# Patient Record
(demographics unavailable — no encounter records)

---

## 2025-02-03 NOTE — PHYSICAL EXAM
[Alert] : alert [Well Nourished] : well nourished [Obese] : obese [No Acute Distress] : no acute distress [Well Developed] : well developed [Normal Sclera/Conjunctiva] : normal sclera/conjunctiva [EOMI] : extra ocular movement intact [No Proptosis] : no proptosis [Normal Oropharynx] : the oropharynx was normal [Thyroid Not Enlarged] : the thyroid was not enlarged [No Thyroid Nodules] : no palpable thyroid nodules [No Respiratory Distress] : no respiratory distress [No Accessory Muscle Use] : no accessory muscle use [Clear to Auscultation] : lungs were clear to auscultation bilaterally [Normal S1, S2] : normal S1 and S2 [Normal Rate] : heart rate was normal [Regular Rhythm] : with a regular rhythm [No Edema] : no peripheral edema [Pedal Pulses Normal] : the pedal pulses are present [Normal Bowel Sounds] : normal bowel sounds [Not Tender] : non-tender [Not Distended] : not distended [Soft] : abdomen soft [Normal Anterior Cervical Nodes] : no anterior cervical lymphadenopathy [Normal Posterior Cervical Nodes] : no posterior cervical lymphadenopathy [No Spinal Tenderness] : no spinal tenderness [Spine Straight] : spine straight [No Stigmata of Cushings Syndrome] : no stigmata of Cushings Syndrome [Normal Gait] : normal gait [Normal Strength/Tone] : muscle strength and tone were normal [No Rash] : no rash [Acanthosis Nigricans] : no acanthosis nigricans [No Motor Deficits] : the motor exam was normal [No Sensory Deficits] : the sensory exam was normal to light touch and pinprick [Normal Reflexes] : deep tendon reflexes were 2+ and symmetric [No Tremors] : no tremors [Oriented x3] : oriented to person, place, and time [de-identified] :  patient BMI is at 28.98

## 2025-02-03 NOTE — HISTORY OF PRESENT ILLNESS
[FreeTextEntry1] :  81-year-old white male who has a past medical history of for type 2 diabetes, hyperlipidemia, presents for routine follow-up.  Patient is currently taking insulin Toujeo 20 units at night, metformin 500 mg tablets 2 tablets twice a day, and glimepiride 4 mg tablets daily.  Patient currently is not taking the Humalog insulin and also the patient does not check his sugars at home.  He does report a slight weight loss of 5 pounds as he is eating healthier his vision has remained stable.  His other medications include lisinopril 10 mg daily omeprazole daily rosuvastatin daily and dicyclomine.  Patient denies any numbness or pain of his lower extremities.  Physically the patient is not very active.  He does have mild area and he is drinking significant amounts of liquids.  Review of systems otherwise negative

## 2025-02-03 NOTE — ASSESSMENT
[Diabetes Foot Care] : diabetes foot care [Long Term Vascular Complications] : long term vascular complications of diabetes [Carbohydrate Consistent Diet] : carbohydrate consistent diet [Importance of Diet and Exercise] : importance of diet and exercise to improve glycemic control, achieve weight loss and improve cardiovascular health [Exercise/Effect on Glucose] : exercise/effect on glucose [Hypoglycemia Management] : hypoglycemia management [Retinopathy Screening] : Patient was referred to ophthalmology for retinopathy screening [FreeTextEntry1] :  elderly white male who has a past medical type 2 diabetes and who is currently on insulin together with metformin.  Patient however is not taking hemologic insulin and he is hemoglobin A1c is 8 .6  his complete metabolic panel is normal.  Patient is noncompliant with his medication and is not taking the Humalog as instructed before and also is not monitoring his blood glucose levels at home recommendation 1.  I have advised the patient to start insulin Humalog 10 units before breakfast and 15 units before dinner 2.  We will arrange a  continuous glucose monitor for the patient so that he can see the values of his blood glucose levels and will adjust the insulin therapy depending upon the readings. 3.  The importance of a strict low-carb and low-fat diet was explained to the patient and also the necessity for regular physical exercise 4.  Patient is scheduled to undergo a complete eye examination within the next few weeks 5.  If the patient remains clinically stable follow-up in approximately 3 months.  The plan was in detail with the patient thank you

## 2025-02-03 NOTE — REVIEW OF SYSTEMS
[Fatigue] : no fatigue [Decreased Appetite] : appetite not decreased [Recent Weight Loss (___ Lbs)] : recent weight loss: [unfilled] lbs [Negative] : Heme/Lymph

## 2025-06-24 NOTE — HISTORY OF PRESENT ILLNESS
[FreeTextEntry1] :  1-year-old white male with past medical history of type 2 diabetes, hyperlipidemia, irritable bowel syndrome who presents for routine follow-up.  Patient is currently on glimepiride 4 mg tablets 1 daily, metformin 500 mg tablets 4 tablets daily at night, insulin Solostar Toujeo 20 units at nighttime.  Patient also informs me that he is taking Trulicity but he does not recall the dose and the doctor who had  prescribed  it. patient did not bring his glucose log book.  He claims that the sugar levels are always high closer to 150 mg per DL especially after the meals.  He does have mild polyuria and polydipsia and chronic fatigue.  His weight has remained stable and he denies any change in his vision or any numbness of extremities.  Physically the patient is not active and does not perform any exercise or walking review of the systems is negative for headache, chest pain, shortness of breath, nausea vomiting or any skin rash

## 2025-06-24 NOTE — PHYSICAL EXAM
[Alert] : alert [Well Nourished] : well nourished [No Acute Distress] : no acute distress [Well Developed] : well developed [Normal Sclera/Conjunctiva] : normal sclera/conjunctiva [EOMI] : extra ocular movement intact [No Proptosis] : no proptosis [Normal Oropharynx] : the oropharynx was normal [Thyroid Not Enlarged] : the thyroid was not enlarged [No Thyroid Nodules] : no palpable thyroid nodules [No Respiratory Distress] : no respiratory distress [No Accessory Muscle Use] : no accessory muscle use [Clear to Auscultation] : lungs were clear to auscultation bilaterally [Normal S1, S2] : normal S1 and S2 [Normal Rate] : heart rate was normal [Regular Rhythm] : with a regular rhythm [Carotids Normal] : carotid pulses were normal with no bruits [No Edema] : no peripheral edema [Pedal Pulses Normal] : the pedal pulses are present [Normal Bowel Sounds] : normal bowel sounds [Not Tender] : non-tender [Not Distended] : not distended [Soft] : abdomen soft [No HSM] : no hepato-splenomegaly [Normal Supraclavicular Nodes] : no supraclavicular lymphadenopathy [Normal Anterior Cervical Nodes] : no anterior cervical lymphadenopathy [Normal Posterior Cervical Nodes] : no posterior cervical lymphadenopathy [No CVA Tenderness] : no ~M costovertebral angle tenderness [No Spinal Tenderness] : no spinal tenderness [Spine Straight] : spine straight [No Stigmata of Cushings Syndrome] : no stigmata of Cushings Syndrome [Normal Gait] : normal gait [No Clubbing, Cyanosis] : no clubbing  or cyanosis of the fingernails [No Joint Swelling] : no joint swelling seen [Normal Strength/Tone] : muscle strength and tone were normal [No Rash] : no rash [No Skin Lesions] : no skin lesions [Acanthosis Nigricans] : no acanthosis nigricans [Foot Ulcers] : no foot ulcers [No Motor Deficits] : the motor exam was normal [No Sensory Deficits] : the sensory exam was normal to light touch and pinprick [Normal Reflexes] : deep tendon reflexes were 2+ and symmetric [No Tremors] : no tremors [Oriented x3] : oriented to person, place, and time [de-identified] :  patient BMI is at 28.19 weight is 180 pounds

## 2025-06-24 NOTE — REVIEW OF SYSTEMS
[Fatigue] : fatigue [Decreased Appetite] : appetite not decreased [Fever] : no fever [Chills] : no chills [Negative] : Heme/Lymph

## 2025-06-24 NOTE — ASSESSMENT
[Diabetes Foot Care] : diabetes foot care [Long Term Vascular Complications] : long term vascular complications of diabetes [Carbohydrate Consistent Diet] : carbohydrate consistent diet [Importance of Diet and Exercise] : importance of diet and exercise to improve glycemic control, achieve weight loss and improve cardiovascular health [Exercise/Effect on Glucose] : exercise/effect on glucose [Hypoglycemia Management] : hypoglycemia management [Self Monitoring of Blood Glucose] : self monitoring of blood glucose [Retinopathy Screening] : Patient was referred to ophthalmology for retinopathy screening [FreeTextEntry1] :  daily white male with a past medical history of type 2 diabetes, hyperlipidemia presents for routine follow-up.  Patient denies having a recent blood test.  Also the patient fingerstick at home have been ranging high up to 250 mL after the meals.  Patient was supposed to be taking insulin Humulin R prior to the meal but he never took it.  Says he does not like to take too much insulin..  I had a lengthy discussion with the patient and explained to him in detail the risks of long-term vascular complications which are associated with   poorly controlled diabetes.  Patient promises that he will try to take the insulin before the meals.  Recommendation 1.  I have advised the patient to take insulin Humalog 5 units before breakfast and 15 units before dinner.  The patient will continue with the glimepiride 4 mg tablets daily, metformin 500 milligram tablets 4 daily and insulin Solostar Toujeo 20 units at nighttime. 2.  I have requested the patient to call our office with the dose of the Trulicity which he is taking and also the name of the doctor who prescribed it. 3.  The important role of a strict low carbohydrate and low-fat diet was discussed and explained to the patient including the need for moderate physical activity as tolerated. 4.  I also explained to the patient the signs and symptoms of hypoglycemia with the  management of the symptoms.  Footcare was discussed with the patient and the need for regular follow-up with the podiatrist. 5.  I am referring the patient for complete blood analysis. 6.  The patient will  follow-up in 3 to 4 months.  The plan discussed with the patient thank you